# Patient Record
Sex: FEMALE | Race: WHITE | NOT HISPANIC OR LATINO | Employment: FULL TIME | URBAN - METROPOLITAN AREA
[De-identification: names, ages, dates, MRNs, and addresses within clinical notes are randomized per-mention and may not be internally consistent; named-entity substitution may affect disease eponyms.]

---

## 2023-02-07 ENCOUNTER — APPOINTMENT (OUTPATIENT)
Dept: GENERAL RADIOLOGY | Facility: HOSPITAL | Age: 58
End: 2023-02-07
Payer: COMMERCIAL

## 2023-02-07 ENCOUNTER — HOSPITAL ENCOUNTER (EMERGENCY)
Facility: HOSPITAL | Age: 58
Discharge: HOME | End: 2023-02-07
Attending: EMERGENCY MEDICINE | Admitting: EMERGENCY MEDICINE
Payer: COMMERCIAL

## 2023-02-07 VITALS
DIASTOLIC BLOOD PRESSURE: 62 MMHG | OXYGEN SATURATION: 97 % | RESPIRATION RATE: 18 BRPM | HEIGHT: 67 IN | HEART RATE: 69 BPM | TEMPERATURE: 98.2 F | SYSTOLIC BLOOD PRESSURE: 117 MMHG | WEIGHT: 284 LBS | BODY MASS INDEX: 44.57 KG/M2

## 2023-02-07 DIAGNOSIS — R00.2 PALPITATIONS: Primary | ICD-10-CM

## 2023-02-07 DIAGNOSIS — R55 NEAR SYNCOPE: ICD-10-CM

## 2023-02-07 LAB
ALBUMIN SERPL-MCNC: 4.5 G/DL (ref 3.5–5.2)
ALBUMIN/GLOB SERPL: 1.6 G/DL
ALP SERPL-CCNC: 73 U/L (ref 39–117)
ALT SERPL W P-5'-P-CCNC: 36 U/L (ref 1–33)
ANION GAP SERPL CALCULATED.3IONS-SCNC: 10 MMOL/L (ref 5–15)
AST SERPL-CCNC: 34 U/L (ref 1–32)
BASOPHILS # BLD AUTO: 0.02 10*3/MM3 (ref 0–0.2)
BASOPHILS NFR BLD AUTO: 0.4 % (ref 0–1.5)
BILIRUB SERPL-MCNC: 0.8 MG/DL (ref 0–1.2)
BILIRUB UR QL STRIP: NEGATIVE
BUN SERPL-MCNC: 12 MG/DL (ref 6–20)
BUN/CREAT SERPL: 15.4 (ref 7–25)
CALCIUM SPEC-SCNC: 9.2 MG/DL (ref 8.6–10.5)
CHLORIDE SERPL-SCNC: 104 MMOL/L (ref 98–107)
CLARITY UR: CLEAR
CO2 SERPL-SCNC: 26 MMOL/L (ref 22–29)
COLOR UR: YELLOW
CREAT SERPL-MCNC: 0.78 MG/DL (ref 0.57–1)
DEPRECATED RDW RBC AUTO: 41.8 FL (ref 37–54)
EGFRCR SERPLBLD CKD-EPI 2021: 89.8 ML/MIN/1.73
EOSINOPHIL # BLD AUTO: 0.04 10*3/MM3 (ref 0–0.4)
EOSINOPHIL NFR BLD AUTO: 0.9 % (ref 0.3–6.2)
ERYTHROCYTE [DISTWIDTH] IN BLOOD BY AUTOMATED COUNT: 12.5 % (ref 12.3–15.4)
GEN 5 2HR TROPONIN T REFLEX: 8 NG/L
GLOBULIN UR ELPH-MCNC: 2.8 GM/DL
GLUCOSE SERPL-MCNC: 102 MG/DL (ref 65–99)
GLUCOSE UR STRIP-MCNC: NEGATIVE MG/DL
HCT VFR BLD AUTO: 46 % (ref 34–46.6)
HGB BLD-MCNC: 15.3 G/DL (ref 12–15.9)
HGB UR QL STRIP.AUTO: NEGATIVE
HOLD SPECIMEN: NORMAL
IMM GRANULOCYTES # BLD AUTO: 0.02 10*3/MM3 (ref 0–0.05)
IMM GRANULOCYTES NFR BLD AUTO: 0.4 % (ref 0–0.5)
KETONES UR QL STRIP: NEGATIVE
LEUKOCYTE ESTERASE UR QL STRIP.AUTO: NEGATIVE
LIPASE SERPL-CCNC: 31 U/L (ref 13–60)
LYMPHOCYTES # BLD AUTO: 0.98 10*3/MM3 (ref 0.7–3.1)
LYMPHOCYTES NFR BLD AUTO: 21.4 % (ref 19.6–45.3)
MCH RBC QN AUTO: 30.5 PG (ref 26.6–33)
MCHC RBC AUTO-ENTMCNC: 33.3 G/DL (ref 31.5–35.7)
MCV RBC AUTO: 91.6 FL (ref 79–97)
MONOCYTES # BLD AUTO: 0.26 10*3/MM3 (ref 0.1–0.9)
MONOCYTES NFR BLD AUTO: 5.7 % (ref 5–12)
NEUTROPHILS NFR BLD AUTO: 3.25 10*3/MM3 (ref 1.7–7)
NEUTROPHILS NFR BLD AUTO: 71.2 % (ref 42.7–76)
NITRITE UR QL STRIP: NEGATIVE
NRBC BLD AUTO-RTO: 0 /100 WBC (ref 0–0.2)
NT-PROBNP SERPL-MCNC: 34.4 PG/ML (ref 0–900)
PH UR STRIP.AUTO: 6 [PH] (ref 5–8)
PLATELET # BLD AUTO: 244 10*3/MM3 (ref 140–450)
PMV BLD AUTO: 9.2 FL (ref 6–12)
POTASSIUM SERPL-SCNC: 4 MMOL/L (ref 3.5–5.2)
PROT SERPL-MCNC: 7.3 G/DL (ref 6–8.5)
PROT UR QL STRIP: NEGATIVE
RBC # BLD AUTO: 5.02 10*6/MM3 (ref 3.77–5.28)
SODIUM SERPL-SCNC: 140 MMOL/L (ref 136–145)
SP GR UR STRIP: 1.01 (ref 1–1.03)
T4 FREE SERPL-MCNC: 2.3 NG/DL (ref 0.93–1.7)
TROPONIN T DELTA: 1 NG/L
TROPONIN T SERPL HS-MCNC: 7 NG/L
TSH SERPL DL<=0.05 MIU/L-ACNC: 1.76 UIU/ML (ref 0.27–4.2)
UROBILINOGEN UR QL STRIP: NORMAL
WBC NRBC COR # BLD: 4.57 10*3/MM3 (ref 3.4–10.8)
WHOLE BLOOD HOLD COAG: NORMAL
WHOLE BLOOD HOLD SPECIMEN: NORMAL

## 2023-02-07 PROCEDURE — 93005 ELECTROCARDIOGRAM TRACING: CPT

## 2023-02-07 PROCEDURE — 81003 URINALYSIS AUTO W/O SCOPE: CPT

## 2023-02-07 PROCEDURE — 83690 ASSAY OF LIPASE: CPT

## 2023-02-07 PROCEDURE — 99284 EMERGENCY DEPT VISIT MOD MDM: CPT

## 2023-02-07 PROCEDURE — 84484 ASSAY OF TROPONIN QUANT: CPT

## 2023-02-07 PROCEDURE — 84484 ASSAY OF TROPONIN QUANT: CPT | Performed by: EMERGENCY MEDICINE

## 2023-02-07 PROCEDURE — 36415 COLL VENOUS BLD VENIPUNCTURE: CPT

## 2023-02-07 PROCEDURE — 71045 X-RAY EXAM CHEST 1 VIEW: CPT

## 2023-02-07 PROCEDURE — 83880 ASSAY OF NATRIURETIC PEPTIDE: CPT

## 2023-02-07 PROCEDURE — 80050 GENERAL HEALTH PANEL: CPT

## 2023-02-07 PROCEDURE — 84439 ASSAY OF FREE THYROXINE: CPT | Performed by: PHYSICIAN ASSISTANT

## 2023-02-07 PROCEDURE — 93005 ELECTROCARDIOGRAM TRACING: CPT | Performed by: EMERGENCY MEDICINE

## 2023-02-07 RX ORDER — ASPIRIN 81 MG/1
324 TABLET, CHEWABLE ORAL ONCE
Status: DISCONTINUED | OUTPATIENT
Start: 2023-02-07 | End: 2023-02-07

## 2023-02-07 RX ORDER — ALPRAZOLAM 0.25 MG/1
0.5 TABLET ORAL ONCE
Status: COMPLETED | OUTPATIENT
Start: 2023-02-07 | End: 2023-02-07

## 2023-02-07 RX ORDER — SODIUM CHLORIDE 0.9 % (FLUSH) 0.9 %
10 SYRINGE (ML) INJECTION AS NEEDED
Status: DISCONTINUED | OUTPATIENT
Start: 2023-02-07 | End: 2023-02-07 | Stop reason: HOSPADM

## 2023-02-07 RX ADMIN — ALPRAZOLAM 0.25 MG: 0.25 TABLET ORAL at 17:29

## 2023-02-07 NOTE — ED PROVIDER NOTES
EMERGENCY DEPARTMENT ENCOUNTER    Pt Name: Casie Butterfield  MRN: [id number]  Pt :   1965  Room Number:    Date of encounter:  2023  PCP: Mickey Guzman MD  ED Provider: Soy Can PA-C    Historian: Patient      HPI:  Chief Complaint: Palpitations        Context: Casie Butterfield is a 57 y.o. female who presents to the ED c/o intermittent palpitations that she has had for some time, but has seemed to have worsened since yesterday.  She describes random intermittent palpitations that cause her to feel a little breathless for the moment and then resolve spontaneously.  She does note that they sometimes occur when she is having more episodes of GERD.  She states the episodes make her feel a little anxious, they are not associated with chest pain arm neck jaw shoulder pain.  No syncope, but did have an episode yesterday of lightheadedness associated with feelings of near syncope.  No history of thyroid disease.  She was seen by her primary care provider yesterday who performed an EKG and blood work that so far is normal.  Her symptoms continued today so she was advised to present to the emergency department for evaluation.  She does have a history of hypertension, takes losartan 50 mg daily.      PAST MEDICAL HISTORY  HTN - losartan 50mg  Allergic rhinitis      PAST SURGICAL HISTORY  GB  C-sect x 2  Fibroid cyst from back    FAMILY HISTORY      SOCIAL HISTORY  Social History     Socioeconomic History   • Marital status:          ALLERGIES  Other        REVIEW OF SYSTEMS  Review of Systems   Constitutional: Positive for activity change. Negative for appetite change, chills, diaphoresis, fatigue and fever.   HENT: Negative for congestion, rhinorrhea and sore throat.    Respiratory: Negative for cough, shortness of breath and wheezing.    Cardiovascular: Positive for palpitations. Negative for chest pain and leg swelling.   Gastrointestinal: Positive for abdominal pain.  Negative for nausea and vomiting.   Genitourinary: Negative for dysuria, flank pain and hematuria.   Musculoskeletal: Negative for arthralgias, back pain, myalgias and neck pain.   Neurological: Positive for dizziness and light-headedness. Negative for syncope.   All other systems reviewed and are negative.         All systems reviewed and negative except for those discussed in HPI.       PHYSICAL EXAM    I have reviewed the triage vital signs and nursing notes.    ED Triage Vitals [02/07/23 1127]   Temp Heart Rate Resp BP SpO2   98.2 °F (36.8 °C) 105 18 (!) 149/105 99 %      Temp src Heart Rate Source Patient Position BP Location FiO2 (%)   Oral Monitor Sitting Left arm --       Physical Exam  GENERAL:   Appears in no acute distress.  Pleasant awake alert and oriented nontoxic-appearing hemodynamically stable, not in acute distress.  HENT: Nares patent.  No facial asymmetry, neck is supple, no palpable goiters or nodules.  EYES: No scleral icterus.  CV: Regular rhythm, regular rate.  Regular rate and rhythm no murmurs rubs or gallops.  RESPIRATORY: Normal effort.  No audible wheezes, rales or rhonchi.  ABDOMEN: Soft, nontender  MUSCULOSKELETAL: No deformities.  No LE edema  NEURO: Alert, moves all extremities, follows commands.  SKIN: Warm, dry, no rash visualized.      LAB RESULTS  Recent Results (from the past 24 hour(s))   ECG 12 Lead ED Triage Standing Order; Chest Pain    Collection Time: 02/07/23 11:37 AM   Result Value Ref Range    QT Interval 352 ms    QTC Interval 401 ms   Urinalysis With Microscopic If Indicated (No Culture) - Urine, Clean Catch    Collection Time: 02/07/23 11:40 AM    Specimen: Urine, Clean Catch   Result Value Ref Range    Color, UA Yellow Yellow, Straw    Appearance, UA Clear Clear    pH, UA 6.0 5.0 - 8.0    Specific Gravity, UA 1.010 1.001 - 1.030    Glucose, UA Negative Negative    Ketones, UA Negative Negative    Bilirubin, UA Negative Negative    Blood, UA Negative Negative     Protein, UA Negative Negative    Leuk Esterase, UA Negative Negative    Nitrite, UA Negative Negative    Urobilinogen, UA 0.2 E.U./dL 0.2 - 1.0 E.U./dL   Troponin    Collection Time: 02/07/23 11:43 AM    Specimen: Blood   Result Value Ref Range    HS Troponin T 7 <10 ng/L   Comprehensive Metabolic Panel    Collection Time: 02/07/23 11:43 AM    Specimen: Blood   Result Value Ref Range    Glucose 102 (H) 65 - 99 mg/dL    BUN 12 6 - 20 mg/dL    Creatinine 0.78 0.57 - 1.00 mg/dL    Sodium 140 136 - 145 mmol/L    Potassium 4.0 3.5 - 5.2 mmol/L    Chloride 104 98 - 107 mmol/L    CO2 26.0 22.0 - 29.0 mmol/L    Calcium 9.2 8.6 - 10.5 mg/dL    Total Protein 7.3 6.0 - 8.5 g/dL    Albumin 4.5 3.5 - 5.2 g/dL    ALT (SGPT) 36 (H) 1 - 33 U/L    AST (SGOT) 34 (H) 1 - 32 U/L    Alkaline Phosphatase 73 39 - 117 U/L    Total Bilirubin 0.8 0.0 - 1.2 mg/dL    Globulin 2.8 gm/dL    A/G Ratio 1.6 g/dL    BUN/Creatinine Ratio 15.4 7.0 - 25.0    Anion Gap 10.0 5.0 - 15.0 mmol/L    eGFR 89.8 >60.0 mL/min/1.73   Lipase    Collection Time: 02/07/23 11:43 AM    Specimen: Blood   Result Value Ref Range    Lipase 31 13 - 60 U/L   BNP    Collection Time: 02/07/23 11:43 AM    Specimen: Blood   Result Value Ref Range    proBNP 34.4 0.0 - 900.0 pg/mL   Green Top (Gel)    Collection Time: 02/07/23 11:43 AM   Result Value Ref Range    Extra Tube Hold for add-ons.    Lavender Top    Collection Time: 02/07/23 11:43 AM   Result Value Ref Range    Extra Tube hold for add-on    Gold Top - SST    Collection Time: 02/07/23 11:43 AM   Result Value Ref Range    Extra Tube Hold for add-ons.    Gray Top    Collection Time: 02/07/23 11:43 AM   Result Value Ref Range    Extra Tube Hold for add-ons.    Light Blue Top    Collection Time: 02/07/23 11:43 AM   Result Value Ref Range    Extra Tube Hold for add-ons.    CBC Auto Differential    Collection Time: 02/07/23 11:43 AM    Specimen: Blood   Result Value Ref Range    WBC 4.57 3.40 - 10.80 10*3/mm3    RBC 5.02 3.77  - 5.28 10*6/mm3    Hemoglobin 15.3 12.0 - 15.9 g/dL    Hematocrit 46.0 34.0 - 46.6 %    MCV 91.6 79.0 - 97.0 fL    MCH 30.5 26.6 - 33.0 pg    MCHC 33.3 31.5 - 35.7 g/dL    RDW 12.5 12.3 - 15.4 %    RDW-SD 41.8 37.0 - 54.0 fl    MPV 9.2 6.0 - 12.0 fL    Platelets 244 140 - 450 10*3/mm3    Neutrophil % 71.2 42.7 - 76.0 %    Lymphocyte % 21.4 19.6 - 45.3 %    Monocyte % 5.7 5.0 - 12.0 %    Eosinophil % 0.9 0.3 - 6.2 %    Basophil % 0.4 0.0 - 1.5 %    Immature Grans % 0.4 0.0 - 0.5 %    Neutrophils, Absolute 3.25 1.70 - 7.00 10*3/mm3    Lymphocytes, Absolute 0.98 0.70 - 3.10 10*3/mm3    Monocytes, Absolute 0.26 0.10 - 0.90 10*3/mm3    Eosinophils, Absolute 0.04 0.00 - 0.40 10*3/mm3    Basophils, Absolute 0.02 0.00 - 0.20 10*3/mm3    Immature Grans, Absolute 0.02 0.00 - 0.05 10*3/mm3    nRBC 0.0 0.0 - 0.2 /100 WBC   T4, Free    Collection Time: 02/07/23 11:43 AM    Specimen: Blood   Result Value Ref Range    Free T4 2.30 (H) 0.93 - 1.70 ng/dL   TSH    Collection Time: 02/07/23 11:43 AM    Specimen: Blood   Result Value Ref Range    TSH 1.760 0.270 - 4.200 uIU/mL   ECG 12 Lead ED Triage Standing Order; Chest Pain    Collection Time: 02/07/23  2:02 PM   Result Value Ref Range    QT Interval 382 ms    QTC Interval 418 ms   High Sensitivity Troponin T 2Hr    Collection Time: 02/07/23  2:04 PM    Specimen: Blood   Result Value Ref Range    HS Troponin T 8 <10 ng/L    Troponin T Delta 1 <= -/+ 4 Change ng/L       If labs were ordered, I independently reviewed the results and considered them in treating the patient.        RADIOLOGY  XR Chest 1 View    Result Date: 2/7/2023  XR CHEST 1 VW Date of Exam: 2/7/2023 12:39 PM EST Indication: Chest Pain Triage Protocol. Comparison: None available. FINDINGS: No focal airspace opacity. No pleural effusion or pneumothorax. Normal heart and mediastinal contours.     No evidence of acute disease in the chest. Electronically Signed: Pipe Willson  2/7/2023 1:10 PM EST  Workstation ID:  [id number]            PROCEDURES    Procedures    ECG 12 Lead ED Triage Standing Order; Chest Pain   Preliminary Result   Test Reason : ED Triage Standing Order~   Blood Pressure :   */*   mmHG   Vent. Rate :  72 BPM     Atrial Rate :  72 BPM      P-R Int : 186 ms          QRS Dur :  74 ms       QT Int : 382 ms       P-R-T Axes :  45  19  32 degrees      QTc Int : 418 ms      Normal sinus rhythm   Normal ECG   When compared with ECG of 07-FEB-2023 11:37, (Unconfirmed)   No significant change was found      Referred By: ERMD           Confirmed By:       ECG 12 Lead ED Triage Standing Order; Chest Pain   Preliminary Result   Test Reason : ED Triage Standing Order~   Blood Pressure :   */*   mmHG   Vent. Rate :  78 BPM     Atrial Rate :  78 BPM      P-R Int : 172 ms          QRS Dur :  68 ms       QT Int : 352 ms       P-R-T Axes :  51  12  35 degrees      QTc Int : 401 ms      Normal sinus rhythm   Normal ECG   No previous ECGs available      Referred By: ED MD           Confirmed By:           MEDICATIONS GIVEN IN ER    Medications   ALPRAZolam (XANAX) tablet 0.5 mg (0.25 mg Oral Given 2/7/23 1729)         MEDICAL DECISION MAKING, PROGRESS, and CONSULTS    All labs have been independently reviewed by me.  All radiology studies have been reviewed by me and the radiologist dictating the report.  All EKG's have been independently viewed and interpreted by me.      Discussion below represents my analysis of pertinent findings related to patient's condition, differential diagnosis, treatment plan and final disposition.      Differential diagnosis:    Wide differential including premature atrial contractions, PVCs, atrial/ventricular arrhythmia, electrolyte abnormality, thyroid dysfunction    Additional sources:    - Discussed/ obtained information from independent historians: Patient provided her own history    - External (non-ED) record review: No pertinent external records available to review    - Chronic or social  conditions impacting care:      - Shared decision making:        Orders placed during this visit:  Orders Placed This Encounter   Procedures   • XR Chest 1 View   • [place] Draw   • Troponin   • Comprehensive Metabolic Panel   • Lipase   • BNP   • CBC Auto Differential   • Urinalysis With Microscopic If Indicated (No Culture) - Urine, Clean Catch   • High Sensitivity Troponin T 2Hr   • T4, Free   • TSH   • Ambulatory Referral to [facility] - Arrhythmia Clinic   • Undress & Gown   • Continuous Pulse Oximetry   • ECG 12 Lead ED Triage Standing Order; Chest Pain   • CBC & Differential   • Green Top (Gel)   • Lavender Top   • Gold Top - SST   • Gray Top   • Light Blue Top         Additional orders considered but not ordered:      ED Course:    Consultants:      ED Course as of 02/07/23 2106 Tue Feb 07, 2023   1416 Free T4(!): 2.30 [TG]   1416 TSH Baseline: 1.760 [TG]      ED Course User Index  [TG] Soy Can PA-C            Cardiology has placed a 7-day monitor on patient.  She is to mail the monitor back in when finished.  She will be referred to [facility] heart and valves arrhythmia clinic.  She is to return if any change or worsening of symptoms in interim.  She verbalizes understanding and is agreeable to plan.      AS OF 21:06 EST VITALS:    BP - 117/62  HR - 69  TEMP - 98.2 °F (36.8 °C) (Oral)  O2 SATS - 97%                  DIAGNOSIS  Final diagnoses:   Palpitations   Near syncope         DISPOSITION  DISCHARGE    Patient discharged in stable condition.    Reviewed implications of results, diagnosis, meds, responsibility to follow up, warning signs and symptoms of possible worsening, potential complications and reasons to return to ER.    Patient/Family voiced understanding of above instructions.    Discussed plan for discharge, as there is no emergent indication for admission.  Pt/family is agreeable and understands need for follow up and possible repeat testing.  Pt/family is aware that discharge does not  mean that nothing is wrong but that it indicates no emergency is currently present that requires admission and they must continue care with follow-up as given below or with a physician of their choice.     FOLLOW-UP  [facility] CARDIOLOGY  [address]  [place] [address]  [facility] [id number] [phone]        Mickey Guzman MD  [address] [place] [address] [address] [place] [address] [phone]    Schedule an appointment as soon as possible for a visit            Medication List      No changes were made to your prescriptions during this visit.             Please note that portions of this document were completed with voice recognition software.      Soy Can PA-C  02/07/23 0289

## 2023-02-07 NOTE — DISCHARGE INSTRUCTIONS
You have been referred to [facility]'s arrhythmia clinic.  You should receive a call by 10 AM next business day to schedule your appointment.  If you do not hear for ministration by 10 AM tomorrow, call [phone], ask to speak with registration, schedule your appointment.  Return to the emergency department immediately if any change or worsening of symptoms.

## 2023-02-14 ENCOUNTER — OFFICE VISIT (OUTPATIENT)
Dept: CARDIOLOGY | Facility: HOSPITAL | Age: 58
End: 2023-02-14
Payer: COMMERCIAL

## 2023-02-14 VITALS
DIASTOLIC BLOOD PRESSURE: 83 MMHG | WEIGHT: 283.8 LBS | SYSTOLIC BLOOD PRESSURE: 147 MMHG | RESPIRATION RATE: 20 BRPM | OXYGEN SATURATION: 99 % | TEMPERATURE: 98.1 F | BODY MASS INDEX: 44.54 KG/M2 | HEIGHT: 67 IN | HEART RATE: 87 BPM

## 2023-02-14 DIAGNOSIS — R55 NEAR SYNCOPE: ICD-10-CM

## 2023-02-14 DIAGNOSIS — F41.9 ANXIETY: ICD-10-CM

## 2023-02-14 DIAGNOSIS — R00.2 PALPITATIONS: Primary | ICD-10-CM

## 2023-02-14 PROCEDURE — 99203 OFFICE O/P NEW LOW 30 MIN: CPT | Performed by: NURSE PRACTITIONER

## 2023-02-14 RX ORDER — LOSARTAN POTASSIUM 50 MG/1
1 TABLET ORAL DAILY
COMMUNITY
Start: 2022-12-01

## 2023-02-14 RX ORDER — LORATADINE 10 MG/1
10 TABLET ORAL DAILY
COMMUNITY

## 2023-02-14 RX ORDER — ROSUVASTATIN CALCIUM 5 MG/1
1 TABLET, COATED ORAL NIGHTLY
COMMUNITY
Start: 2023-01-16

## 2023-02-14 RX ORDER — FLUTICASONE PROPIONATE 50 MCG
2 SPRAY, SUSPENSION (ML) NASAL EVERY MORNING
COMMUNITY
Start: 2022-12-17

## 2023-02-14 RX ORDER — DEXLANSOPRAZOLE 60 MG/1
60 CAPSULE, DELAYED RELEASE ORAL DAILY PRN
COMMUNITY

## 2023-02-14 RX ORDER — MONTELUKAST SODIUM 10 MG/1
1 TABLET ORAL
COMMUNITY
Start: 2022-12-20

## 2023-02-14 NOTE — PROGRESS NOTES
"Chief Complaint  Establish Care (/), Palpitations (/), and Near Syncope    Subjective    History of Present Illness {CC  Problem List  Visit  Diagnosis   Encounters  Notes  Medications  Labs  Result Review Imaging  Media :23}       History of Present Illness   57-year-old female presents the office today for ongoing evaluation of her palpitations and near syncope.  She presented to [facility] ED on 2/7/2023 with complaints of near syncope patient's.  She does report anxiety as well.  Patient was placed in an extended Holter and we will mail that back today.  She currently denies chest pain dyspnea, syncope, pedal edema.Past medical history significant for hyperlipidemia, hypertension, allergies, GERD  Objective     Vital Signs:   Vitals:    02/14/23 1004 02/14/23 1005 02/14/23 1006   BP: 146/78 143/80 147/83   BP Location: Right arm Left arm Left arm   Patient Position: Sitting Standing Sitting   Cuff Size: Large Adult Large Adult Large Adult   Pulse: 81 89 87   Resp:   20   Temp: 96.8 °F (36 °C) 98.1 °F (36.7 °C) 98.1 °F (36.7 °C)   TempSrc: Temporal Temporal Temporal   SpO2: 98% 98% 99%   Weight:   129 kg (283 lb 12.8 oz)   Height:   170.2 cm (67\")     Body mass index is 44.45 kg/m².  Physical Exam  Vitals and nursing note reviewed.   Constitutional:       Appearance: Normal appearance.   HENT:      Head: Normocephalic.   Eyes:      Pupils: Pupils are equal, round, and reactive to light.   Cardiovascular:      Rate and Rhythm: Normal rate and regular rhythm.      Pulses: Normal pulses.      Heart sounds: Normal heart sounds. No murmur heard.  Pulmonary:      Effort: Pulmonary effort is normal.      Breath sounds: Normal breath sounds.   Abdominal:      General: Bowel sounds are normal.      Palpations: Abdomen is soft.   Musculoskeletal:         General: Normal range of motion.      Cervical back: Normal range of motion.      Right lower leg: No edema.      Left lower leg: No edema. "   Skin:     General: Skin is warm and dry.      Capillary Refill: Capillary refill takes less than 2 seconds.   Neurological:      Mental Status: She is alert and oriented to person, place, and time.   Psychiatric:         Mood and Affect: Mood normal.         Thought Content: Thought content normal.              Result Review  Data Reviewed:{ Labs  Result Review  Imaging  Med Tab  Media :23}     Holter pending  High Sensitivity Troponin T 2Hr (02/07/2023 14:04)  TSH (02/07/2023 11:43)  T4, Free (02/07/2023 11:43)  BNP (02/07/2023 11:43)  Lipase (02/07/2023 11:43)  Comprehensive Metabolic Panel (02/07/2023 11:43)  CBC & Differential (02/07/2023 11:43)  Troponin (02/07/2023 11:43)  Urinalysis With Microscopic If Indicated (No Culture) - Urine, Clean Catch (02/07/2023 11:40)  ECG 12 Lead ED Triage Standing Order; Chest Pain (02/07/2023 11:37)  ECG 12 Lead ED Triage Standing Order; Chest Pain (02/07/2023 14:02)       Assessment and Plan {CC Problem List  Visit Diagnosis  ROS  Review (Popup)  Health Maintenance  Quality  BestPractice  Medications  SmartSets  SnapShot Encounters  Media :23}   1. Palpitations  Patient just completed an extended Holter removal mail that back today   will call patient once results are finalized    2. Near syncope  Encouraged good hydration    3. Anxiety  Encourage patient to find ways to manage anxiety          Follow Up {Instructions Charge Capture  Follow-up Communications :23}   Return if symptoms worsen or fail to improve.    Patient was given instructions and counseling regarding her condition or for health maintenance advice. Please see specific information pulled into the AVS if appropriate.  Patient was instructed to call the [facility] with any questions, concerns, or worsening symptoms.

## 2023-02-27 ENCOUNTER — TELEPHONE (OUTPATIENT)
Dept: CARDIOLOGY | Facility: HOSPITAL | Age: 58
End: 2023-02-27
Payer: COMMERCIAL

## 2023-02-27 NOTE — TELEPHONE ENCOUNTER
Reviewed 7-day Holter with patient.  Average heart rate 79 bpm, PAC burden 0.02%, PVC burden 0.2% with 7 runs of atrial tach; the longest lasted 13 beats and not associated with patient triggered events.  Patient reports the palpitations have decreased in frequency.  She reports that she has stopped drinking caffeine and is now following the GERD diet.  Encourage patient to continue following GERD diet was instructed to call us if palpitations return.  Patient verbalized understanding.

## 2023-02-28 LAB
QT INTERVAL: 352 MS
QT INTERVAL: 382 MS
QTC INTERVAL: 401 MS
QTC INTERVAL: 418 MS

## 2024-10-14 ENCOUNTER — TELEPHONE (OUTPATIENT)
Dept: CARDIOLOGY | Facility: HOSPITAL | Age: 59
End: 2024-10-14

## 2024-10-14 NOTE — TELEPHONE ENCOUNTER
Caller: Casie Butterfield    Relationship to patient: Self    Best call back number: [phone]     Chief complaint: CHEST PAIN     Type of visit: F/U APPT    Requested date: AT [facility]       Additional notes:PATIENT SEEN IN THE E.D. AT [facility] ON 10-13-24 AND ADVISED TO SEE CARDIOLOGY ABOUT A POSSIBLE STRESS TEST. PLEASE CONTACT PATIENT IN REGARDS TO AN APPT.

## 2025-01-30 ENCOUNTER — OFFICE VISIT (OUTPATIENT)
Dept: GASTROENTEROLOGY | Facility: CLINIC | Age: 60
End: 2025-01-30
Payer: COMMERCIAL

## 2025-01-30 VITALS
TEMPERATURE: 97.4 F | SYSTOLIC BLOOD PRESSURE: 132 MMHG | DIASTOLIC BLOOD PRESSURE: 76 MMHG | HEIGHT: 67 IN | BODY MASS INDEX: 43.32 KG/M2 | OXYGEN SATURATION: 98 % | HEART RATE: 102 BPM | WEIGHT: 276 LBS

## 2025-01-30 DIAGNOSIS — K21.9 GASTROESOPHAGEAL REFLUX DISEASE, UNSPECIFIED WHETHER ESOPHAGITIS PRESENT: Primary | ICD-10-CM

## 2025-01-30 DIAGNOSIS — Z79.1 NSAID LONG-TERM USE: ICD-10-CM

## 2025-01-30 DIAGNOSIS — Z12.11 SCREENING FOR COLON CANCER: ICD-10-CM

## 2025-01-30 DIAGNOSIS — R10.13 EPIGASTRIC PAIN: ICD-10-CM

## 2025-01-30 RX ORDER — DEXLANSOPRAZOLE 60 MG/1
60 CAPSULE, DELAYED RELEASE ORAL DAILY
Qty: 90 CAPSULE | Refills: 3 | Status: SHIPPED | OUTPATIENT
Start: 2025-01-30

## 2025-01-30 RX ORDER — SULFAMETHOXAZOLE AND TRIMETHOPRIM 800; 160 MG/1; MG/1
TABLET ORAL
COMMUNITY
Start: 2024-12-03

## 2025-01-30 NOTE — PROGRESS NOTES
New Patient Consultation     Patient Name: Casie Butterfield  : 1965   MRN: [id number]     Chief Complaint:  No chief complaint on file.      History of Present Illness: Casie Butterfield is a 59 y.o. female, PMH includes HTN, HL, GERD, who is here today for a Gastroenterology Consultation for abdominal pain.     Pt notes onset of epigastric abdominal pain that started in 2024. She identifies stressors such as planning her daughter's wedding and eating out frequently to be particular triggers. She has been taking Dexilant 60mg daily with good relief and resolution of sx. She generally follows a GERD friendly diet. She has had extensive cardiac workup to rule out additional sources of sx.     Patient denies associated fever, chills, nausea, vomiting, diarrhea, constipation, hematemesis, dysphagia, hematochezia, melena, bloating, weight loss or gain, dysuria, jaundice or bruising.    Patient denies personal or FHx of PUD, H Pylori, gastritis, pancreatitis, colitis, Celiac disease, UC, Crohn's disease, IBS, colon or gastric cancers. Pt denies EtOH, tobacco, illicit substance use. S/p C section, tubal ligation, CCY. Diclofenac daily for knee pain. Father and maternal aunt with alpha 1 antitrypsin deficiency; pt states that her personal testing within the last year was unremarkable.     No previous EGD / CSY.     Subjective      Review of Systems:   Review of Systems   Constitutional:  Negative for appetite change, chills, diaphoresis, fatigue, fever, unexpected weight gain and unexpected weight loss.   HENT:  Negative for drooling, facial swelling, mouth sores, rhinorrhea, sore throat, tinnitus, trouble swallowing and voice change.    Eyes: Negative.    Respiratory:  Negative for cough, chest tightness and shortness of breath.    Cardiovascular:  Negative for chest pain.   Gastrointestinal:  Positive for abdominal pain (epigastric, resolved). Negative for blood in stool, constipation,  diarrhea, nausea, vomiting, GERD and indigestion.   Genitourinary:  Negative for dysuria, flank pain, hematuria and pelvic pain.   Skin:  Negative for color change, pallor and rash.   Neurological:  Negative for dizziness, tremors, syncope, weakness and numbness.   Psychiatric/Behavioral:  Negative for hallucinations and sleep disturbance.    All other systems reviewed and are negative.      Past Medical History:   Past Medical History:   Diagnosis Date    Allergies     HTN (hypertension)        Past Surgical History:   Past Surgical History:   Procedure Laterality Date     SECTION      ,     CHOLECYSTECTOMY      LIPOMA EXCISION         Family History:   Family History   Problem Relation Age of Onset    Heart disease Mother     Epilepsy Mother     Other Father         A1A deficiency    Asthma Sister     Mitral valve prolapse Sister     Anxiety disorder Sister     No Known Problems Brother     Other Maternal Grandmother         tobacco use    Heart attack Maternal Grandmother     Alcohol abuse Maternal Grandmother     Heart attack Maternal Grandfather     Hypertension Paternal Grandmother     Parkinsonism Paternal Grandfather        Social History:   Social History     Socioeconomic History    Marital status:    Tobacco Use    Smoking status: Former     Current packs/day: 0.00     Types: Cigarettes     Quit date:      Years since quittin.0    Smokeless tobacco: Never   Vaping Use    Vaping status: Never Used   Substance and Sexual Activity    Alcohol use: Not Currently    Drug use: Never    Sexual activity: Defer       Alcohol/Tobacco History:   Social History     Substance and Sexual Activity   Alcohol Use Not Currently     Social History     Tobacco Use   Smoking Status Former    Current packs/day: 0.00    Types: Cigarettes    Quit date:     Years since quittin.0   Smokeless Tobacco Never       Medications:     Current Outpatient Medications:      sulfamethoxazole-trimethoprim (BACTRIM DS,SEPTRA DS) 800-160 MG per tablet, , Disp: , Rfl:     dexlansoprazole (Dexilant) 60 MG capsule, Take 60 mg by mouth Daily As Needed., Disp: , Rfl:     DICLOFENAC PO, Take 75 mg by mouth Daily., Disp: , Rfl:     fluticasone (FLONASE) 50 MCG/ACT nasal spray, 2 sprays by Each Nare route Every Morning., Disp: , Rfl:     loratadine (Claritin) 10 MG tablet, Take 10 mg by mouth Daily., Disp: , Rfl:     losartan (COZAAR) 50 MG tablet, Take 1 tablet by mouth Daily., Disp: , Rfl:     montelukast (SINGULAIR) 10 MG tablet, Take 1 tablet by mouth every night at bedtime., Disp: , Rfl:     rosuvastatin (CRESTOR) 5 MG tablet, Take 1 tablet by mouth Every Night., Disp: , Rfl:     Allergies:   Allergies   Allergen Reactions    Other Itching     STEROIDS- PT STATES IT CAUSES HER FACE TO TURN HEAD AND BECOME ITCHY       Objective     Physical Exam:  Vital Signs: There were no vitals filed for this visit.  There is no height or weight on file to calculate BMI.     Physical Exam  Vitals and nursing note reviewed.   Constitutional:       Appearance: Normal appearance. She is normal weight. She is not ill-appearing or diaphoretic.      Comments: BMI 43.23   HENT:      Head: Normocephalic and atraumatic.      Right Ear: External ear normal.      Left Ear: External ear normal.      Nose: Nose normal.      Mouth/Throat:      Mouth: Mucous membranes are moist.      Pharynx: Oropharynx is clear.   Eyes:      Conjunctiva/sclera: Conjunctivae normal.      Pupils: Pupils are equal, round, and reactive to light.   Neck:      Thyroid: No thyromegaly.   Cardiovascular:      Rate and Rhythm: Normal rate and regular rhythm.      Pulses: Normal pulses.      Heart sounds: Normal heart sounds.   Pulmonary:      Effort: Pulmonary effort is normal.      Breath sounds: Normal breath sounds.   Abdominal:      General: Abdomen is flat. Bowel sounds are normal. There is no distension.      Tenderness: There is no  abdominal tenderness.   Musculoskeletal:         General: Normal range of motion.      Cervical back: Normal range of motion and neck supple.   Skin:     General: Skin is warm and dry.   Neurological:      General: No focal deficit present.      Mental Status: She is oriented to person, place, and time.   Psychiatric:         Mood and Affect: Mood normal.         Assessment / Plan      Assessment/Plan:   There are no diagnoses linked to this encounter.     GERD  Epigastric pain  Long term NSAID use  Need for screening CSY   - continue Dexilant 60mg daily, refills sent to pharmacy    - pt given GERD diet instructions, advised to avoid GI irritants such as caffeine, carbonation, EtOH, tobacco, chocolate, peppermint, acid-based foods   - schedule for EGD / CSY   - follow up in clinic after completion of above studies   - call clinic at any time for questions or new / worsened sx    Follow Up:   Return for Next scheduled follow up.    Plan of care reviewed with the patient at the conclusion of today's visit.  Education was provided regarding diagnosis, management, and any prescribed or recommended OTC medications.  Patient verbalized understanding of and agreement with management plan.     NOTE TO PATIENT: The 21st Century Cures Act makes medical notes like these available to patients in the interest of transparency. However, be advised this is a medical document. It is intended as peer to peer communication. It is written in medical language and may contain abbreviations or verbiage that are unfamiliar. It may appear blunt or direct. Medical documents are intended to carry relevant information, facts as evident, and the clinical opinion of the practitioner.     Time Statement:   Discussed plan of care in detail with patient today. Patient verbally understands and agrees. I have spent 45 minutes reviewing available diagnostics, obtaining history, examining the patient, developing a treatment plan, and educating the  patient on disease process and plan of care.    Enma Rizo PA-C   [facility]

## 2025-03-13 ENCOUNTER — OUTSIDE FACILITY SERVICE (OUTPATIENT)
Dept: GASTROENTEROLOGY | Facility: CLINIC | Age: 60
End: 2025-03-13
Payer: COMMERCIAL

## 2025-03-13 PROCEDURE — 43235 EGD DIAGNOSTIC BRUSH WASH: CPT | Performed by: INTERNAL MEDICINE

## 2025-03-13 PROCEDURE — 88305 TISSUE EXAM BY PATHOLOGIST: CPT | Performed by: INTERNAL MEDICINE

## 2025-03-13 PROCEDURE — 45380 COLONOSCOPY AND BIOPSY: CPT | Performed by: INTERNAL MEDICINE

## 2025-03-13 PROCEDURE — 45385 COLONOSCOPY W/LESION REMOVAL: CPT | Performed by: INTERNAL MEDICINE

## 2025-03-14 ENCOUNTER — LAB REQUISITION (OUTPATIENT)
Dept: LAB | Facility: HOSPITAL | Age: 60
End: 2025-03-14
Payer: COMMERCIAL

## 2025-03-14 DIAGNOSIS — D12.3 BENIGN NEOPLASM OF TRANSVERSE COLON: ICD-10-CM

## 2025-03-14 DIAGNOSIS — Z12.11 ENCOUNTER FOR SCREENING FOR MALIGNANT NEOPLASM OF COLON: ICD-10-CM

## 2025-03-14 DIAGNOSIS — K64.8 OTHER HEMORRHOIDS: ICD-10-CM

## 2025-03-17 ENCOUNTER — RESULTS FOLLOW-UP (OUTPATIENT)
Dept: LAB | Facility: HOSPITAL | Age: 60
End: 2025-03-17
Payer: COMMERCIAL

## 2025-03-17 LAB
CYTO UR: NORMAL
LAB AP CASE REPORT: NORMAL
LAB AP CLINICAL INFORMATION: NORMAL
PATH REPORT.FINAL DX SPEC: NORMAL
PATH REPORT.GROSS SPEC: NORMAL

## 2025-03-17 NOTE — LETTER
"Casie Butterfield  [address] [address]    March 17, 2025     Dear Ms. Butterfield:    Below are the results from your recent visit:    Resulted Orders   Tissue Pathology Exam   Result Value Ref Range    Case Report       Surgical Pathology Report                         Case: [id number]                                  Authorizing Provider:  Celio Krishnamurthy MD    Collected:           03/13/2025 02:14 PM          Ordering Location:     [facility]   Received:            03/14/2025 07:39 AM                                 LABORATORY                                                                   Pathologist:           Tereza Krishnamurthy DO                                                        Specimens:   1) - Ileum, terminal                                                                                2) - Colon, random colon biopsy                                                                     3) - Large Intestine, Transverse Colon                                                     Clinical Information       Encounter for screening for malignant neoplasm of colon  Benign neoplasm of transverse colon  Other hemorrhoids      Final Diagnosis       1) TERMINAL ILEUM, BIOPSY:  Small bowel mucosa with no significant pathologic abnormality    2) RANDOM COLON, BIOPSY:  Colonic mucosa with no significant pathologic abnormality    3) TRANSVERSE COLON:  Tubular adenoma  No high-grade dysplasia identified      Gross Description       1. Ileum, terminal.  Received in formalin labeled \"terminal ileum\" is a 0.3 x 0.3 x 0.2 cm tan soft tissue fragment submitted entirely in a single cassette.    2. Colon.  Received in formalin labeled \"random colon\" are 2 tan soft tissue fragments aggregating 0.4 x 0.4 x 0.2 cm submitted entirely in a single cassette.    3. Large Intestine, Transverse Colon.  Received in formalin labeled \"transverse colon\" is a 0.3 x 0.2 x 0.2 cm tan soft tissue " fragment submitted entirely in a single cassette. HDM        Microscopic Description       The slides are reviewed and demonstrate histopathologic features supporting the above rendered diagnosis.           Your transverse polyp has precancerous tissue in this location without cancer. I would recommend a repeat colonoscopy in 5 years due to a combination of both the endoscopic results and what our pathologists saw under the microscope.             Sincerely,        Celio rKishnamurthy MD

## 2025-04-22 ENCOUNTER — OFFICE VISIT (OUTPATIENT)
Dept: GASTROENTEROLOGY | Facility: CLINIC | Age: 60
End: 2025-04-22
Payer: COMMERCIAL

## 2025-04-22 VITALS
HEART RATE: 86 BPM | OXYGEN SATURATION: 97 % | SYSTOLIC BLOOD PRESSURE: 130 MMHG | HEIGHT: 67 IN | BODY MASS INDEX: 43.85 KG/M2 | TEMPERATURE: 97.3 F | WEIGHT: 279.4 LBS | DIASTOLIC BLOOD PRESSURE: 80 MMHG

## 2025-04-22 DIAGNOSIS — D12.6 TUBULAR ADENOMA OF COLON: ICD-10-CM

## 2025-04-22 DIAGNOSIS — K21.9 GASTROESOPHAGEAL REFLUX DISEASE, UNSPECIFIED WHETHER ESOPHAGITIS PRESENT: Primary | ICD-10-CM

## 2025-04-22 PROCEDURE — 99214 OFFICE O/P EST MOD 30 MIN: CPT | Performed by: PHYSICIAN ASSISTANT

## 2025-04-22 NOTE — PROGRESS NOTES
Follow Up      Patient Name: Casie Butterfield  : 1965   MRN: [id number]     Chief Complaint:    Chief Complaint   Patient presents with    Follow-up     Follow up from COLONOSCOPY/ EGD, pt is not currently experiencing any GI Symptoms.       History of Present Illness: Casie Butterfield is a 59 y.o. female who is here today for post procedure follow up.     Pt continues to do well with Dexilant 60mg daily. She reports GI sx only with dietary indiscretion / consuming known allergens (paprika, etc). Patient otherwise denies associated fever, chills, abdominal pain, indigestion, nausea, vomiting, diarrhea, constipation, hematemesis, dysphagia, hematochezia, melena, bloating, weight loss or gain, dysuria, jaundice or bruising.    EGD / CSY 3/2025 with Dr. Krishnamurthy. Bilious fluid in the gastric fundus. Mild gastritis in gastric body and antrum. Normal duodenum. Adequate bowel prep conditions. Internal hemorrhoids. Normal terminal ileum. Normal appearing colon mucosa, bx negative for microscopic colitis. 8mm polyp (tubular adenoma) in transverse colon, resected and retrieved. Recommend repeat CSY in 5 years.     Subjective      Review of Systems:   Review of Systems   Constitutional:  Negative for appetite change, chills, diaphoresis, fatigue, fever, unexpected weight gain and unexpected weight loss.   HENT:  Negative for drooling, facial swelling, mouth sores, rhinorrhea, sore throat, tinnitus, trouble swallowing and voice change.    Eyes: Negative.    Respiratory:  Negative for cough, chest tightness and shortness of breath.    Cardiovascular:  Negative for chest pain.   Gastrointestinal:  Negative for abdominal pain, blood in stool, constipation, diarrhea, nausea, vomiting, GERD and indigestion.   Genitourinary:  Negative for dysuria, flank pain, hematuria and pelvic pain.   Skin:  Negative for color change, pallor and rash.   Psychiatric/Behavioral:  Negative for behavioral problems.    All other  "systems reviewed and are negative.      Medications:     Current Outpatient Medications:     dexlansoprazole (Dexilant) 60 MG capsule, Take 1 capsule by mouth Daily., Disp: 90 capsule, Rfl: 3    DICLOFENAC PO, Take 75 mg by mouth Daily., Disp: , Rfl:     fluticasone (FLONASE) 50 MCG/ACT nasal spray, 2 sprays by Each Nare route Every Morning., Disp: , Rfl:     loratadine (Claritin) 10 MG tablet, Take 1 tablet by mouth Daily., Disp: , Rfl:     losartan (COZAAR) 50 MG tablet, Take 1 tablet by mouth Daily., Disp: , Rfl:     montelukast (SINGULAIR) 10 MG tablet, Take 1 tablet by mouth every night at bedtime., Disp: , Rfl:     rosuvastatin (CRESTOR) 5 MG tablet, Take 1 tablet by mouth Every Night., Disp: , Rfl:     Sod Picosulfate-Mag Ox-Cit Acd 10-3.5-12 MG-GM -GM/160ML solution, Take 350 mL by mouth Take As Directed for 1 dose., Disp: 350 mL, Rfl: 0    sulfamethoxazole-trimethoprim (BACTRIM DS,SEPTRA DS) 800-160 MG per tablet, , Disp: , Rfl:     Allergies:   Allergies   Allergen Reactions    Other Itching     STEROIDS- PT STATES IT CAUSES HER FACE TO TURN HEAD AND BECOME ITCHY       Social History:   Social History     Socioeconomic History    Marital status:    Tobacco Use    Smoking status: Former     Current packs/day: 0.00     Types: Cigarettes     Quit date:      Years since quittin.3    Smokeless tobacco: Never   Vaping Use    Vaping status: Never Used   Substance and Sexual Activity    Alcohol use: Not Currently    Drug use: Never    Sexual activity: Defer        Surgical History:   Past Surgical History:   Procedure Laterality Date     SECTION      ,     CHOLECYSTECTOMY  2005    LIPOMA EXCISION          Medical History:   Past Medical History:   Diagnosis Date    Allergies     HTN (hypertension)         Objective     Physical Exam:  Vital Signs:   Vitals:    25 0912   Weight: 127 kg (279 lb 6.4 oz)   Height: 170.2 cm (67.01\")     Body mass index is 43.75 kg/m². "     Physical Exam  Vitals and nursing note reviewed.   Constitutional:       Appearance: Normal appearance. She is normal weight. She is not ill-appearing or diaphoretic.      Comments: BMI 43.75   HENT:      Head: Normocephalic and atraumatic.      Right Ear: External ear normal.      Left Ear: External ear normal.      Nose: Nose normal.      Mouth/Throat:      Mouth: Mucous membranes are moist.      Pharynx: Oropharynx is clear.   Eyes:      Conjunctiva/sclera: Conjunctivae normal.      Pupils: Pupils are equal, round, and reactive to light.   Neck:      Thyroid: No thyromegaly.   Cardiovascular:      Rate and Rhythm: Normal rate and regular rhythm.      Pulses: Normal pulses.      Heart sounds: Normal heart sounds.   Pulmonary:      Effort: Pulmonary effort is normal.      Breath sounds: Normal breath sounds.   Abdominal:      General: Abdomen is flat. Bowel sounds are normal. There is no distension.      Tenderness: There is no abdominal tenderness.   Musculoskeletal:         General: Normal range of motion.      Cervical back: Normal range of motion and neck supple.      Right lower leg: No edema.      Left lower leg: No edema.   Skin:     General: Skin is warm and dry.   Neurological:      General: No focal deficit present.      Mental Status: She is oriented to person, place, and time.   Psychiatric:         Mood and Affect: Mood normal.         Assessment / Plan      Assessment/Plan:   There are no diagnoses linked to this encounter.     GERD  Tubular adenoma of colon   - continue Dexilant 60mg daily    - pt given GERD diet instructions, advised to avoid GI irritants such as caffeine, carbonation, EtOH, tobacco, chocolate, peppermint, acid-based foods   - previous office notes, hospital records, labs, imaging, endoscopy and pathology reports reviewed with patient    - follow up in clinic in 9mo, or after completion of above studies   - call clinic at any time for questions or new / worsened sx      Follow  Up:   Return in about 9 months (around 1/22/2026).    Plan of care reviewed with the patient at the conclusion of today's visit.  Education was provided regarding diagnosis, management, and any prescribed or recommended OTC medications.  Patient verbalized understanding of and agreement with management plan.     NOTE TO PATIENT: The 21st Century Cures Act makes medical notes like these available to patients in the interest of transparency. However, be advised this is a medical document. It is intended as peer to peer communication. It is written in medical language and may contain abbreviations or verbiage that are unfamiliar. It may appear blunt or direct. Medical documents are intended to carry relevant information, facts as evident, and the clinical opinion of the practitioner.     Time Statement:   Discussed plan of care in detail with patient today. Patient verbally understands and agrees. I have spent 30 minutes reviewing available diagnostics, obtaining history, examining the patient, developing a treatment plan, and educating the patient on disease process and plan of care.     Enma Rizo PA-C   [facility]

## 2025-06-12 ENCOUNTER — HOSPITAL ENCOUNTER (EMERGENCY)
Facility: HOSPITAL | Age: 60
Discharge: HOME | End: 2025-06-12
Attending: EMERGENCY MEDICINE
Payer: COMMERCIAL

## 2025-06-12 VITALS
TEMPERATURE: 98 F | OXYGEN SATURATION: 100 % | SYSTOLIC BLOOD PRESSURE: 137 MMHG | WEIGHT: 274 LBS | HEIGHT: 67 IN | HEART RATE: 81 BPM | RESPIRATION RATE: 18 BRPM | DIASTOLIC BLOOD PRESSURE: 75 MMHG | BODY MASS INDEX: 43 KG/M2

## 2025-06-12 DIAGNOSIS — B02.8 HERPES ZOSTER WITH OTHER COMPLICATION: ICD-10-CM

## 2025-06-12 DIAGNOSIS — R51.9 FACIAL PAIN, ACUTE: ICD-10-CM

## 2025-06-12 DIAGNOSIS — G51.0 FACIAL PARALYSIS/BELLS PALSY: Primary | ICD-10-CM

## 2025-06-12 PROCEDURE — 99282 EMERGENCY DEPT VISIT SF MDM: CPT

## 2025-06-12 RX ORDER — PREDNISONE 20 MG/1
20 TABLET ORAL 3 TIMES DAILY
Qty: 21 TABLET | Refills: 0 | Status: SHIPPED | OUTPATIENT
Start: 2025-06-12 | End: 2025-06-19

## 2025-06-12 NOTE — DISCHARGE INSTRUCTIONS
Keep outpatient follow-up with ophthalmology and with primary care physician.    Take prednisone as prescribed.    Keep the right eye moist with eyedrops and tape it shut at night.    Return to the ER with any further concern.

## 2025-06-12 NOTE — ED PROVIDER NOTES
Subjective   History of Present Illness  59-year-old female who presents for evaluation of right sided facial symptoms.  The patient reports that over 2 weeks ago she began to have swelling of her right ear with associated pain posterior to the right ear.  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  Therefore last Thursday the patient was evaluated by ENT.  He was concerned about Melia Paredes but did not definitively see lesions in the external auditory canal or on the tympanic membrane therefore this diagnosis was not administered.  The patient has completed a full course of Erika acyclovir as treatment for possible shingles as she also has a rash along the right side of her neck, the right jaw, and extending mildly to the right side of the neck and right upper chest.  She at least for the last 4 if not more days has been having a slow blink on the right side and continues to have weakness in the right side of the face.  She was referred here by primary care physician.  The patient does not have any weakness to the arms or legs.  She exhibits normal speech and mentation.  No other acute complaints.      Review of Systems   Constitutional:  Negative for chills, fatigue and fever.   HENT:  Negative for congestion, ear pain, postnasal drip, sinus pressure and sore throat.    Eyes:  Negative for pain, redness and visual disturbance.   Respiratory:  Negative for cough, chest tightness and shortness of breath.    Cardiovascular:  Negative for chest pain, palpitations and leg swelling.   Gastrointestinal:  Negative for abdominal pain, anal bleeding, blood in stool, diarrhea, nausea and vomiting.   Endocrine: Negative for polydipsia and polyuria.   Genitourinary:  Negative for difficulty urinating, dysuria, frequency and urgency.   Musculoskeletal:  Negative for arthralgias, back pain and neck pain.   Skin:  Positive for rash. Negative for pallor.    Allergic/Immunologic: Negative for environmental allergies and immunocompromised state.   Neurological:  Positive for weakness and numbness. Negative for dizziness and headaches.   Hematological:  Negative for adenopathy.   Psychiatric/Behavioral:  Negative for confusion, self-injury and suicidal ideas. The patient is not nervous/anxious.    All other systems reviewed and are negative.      Past Medical History:   Diagnosis Date    Allergies     HTN (hypertension)        Allergies   Allergen Reactions    Other Itching     STEROIDS- PT STATES IT CAUSES HER FACE TO TURN HEAD AND BECOME ITCHY       Past Surgical History:   Procedure Laterality Date     SECTION      ,     CHOLECYSTECTOMY      LIPOMA EXCISION         Family History   Problem Relation Age of Onset    Heart disease Mother     Epilepsy Mother     Asthma Sister     Mitral valve prolapse Sister     Anxiety disorder Sister     No Known Problems Brother     Heart attack Maternal Grandmother     Alcohol abuse Maternal Grandmother     Heart attack Maternal Grandfather     Hypertension Paternal Grandmother     Parkinsonism Paternal Grandfather     Colon cancer Neg Hx        Social History     Socioeconomic History    Marital status:    Tobacco Use    Smoking status: Former     Current packs/day: 0.00     Types: Cigarettes     Quit date:      Years since quittin.4    Smokeless tobacco: Never   Vaping Use    Vaping status: Never Used   Substance and Sexual Activity    Alcohol use: Not Currently    Drug use: Never    Sexual activity: Defer           Objective   Physical Exam  Vitals and nursing note reviewed.   Constitutional:       General: She is not in acute distress.     Appearance: Normal appearance. She is well-developed. She is not toxic-appearing or diaphoretic.   HENT:      Head: Normocephalic and atraumatic.      Comments: Right lower facial droop and decreased motor activity of the right forehead.    Obvious Bell's  phenomenon with decreased ability to close the right eyelid without foreceful closing.      Right Ear: External ear normal.      Left Ear: External ear normal.      Nose: Nose normal.   Eyes:      General: Lids are normal.      Pupils: Pupils are equal, round, and reactive to light.   Neck:      Trachea: No tracheal deviation.   Cardiovascular:      Rate and Rhythm: Normal rate and regular rhythm.      Pulses: No decreased pulses.      Heart sounds: Normal heart sounds. No murmur heard.     No friction rub. No gallop.   Pulmonary:      Effort: Pulmonary effort is normal. No respiratory distress.      Breath sounds: Normal breath sounds. No decreased breath sounds, wheezing, rhonchi or rales.   Abdominal:      General: Bowel sounds are normal.      Palpations: Abdomen is soft.      Tenderness: There is no abdominal tenderness. There is no guarding or rebound.   Musculoskeletal:         General: No deformity. Normal range of motion.      Cervical back: Normal range of motion and neck supple.   Lymphadenopathy:      Cervical: No cervical adenopathy.   Skin:     General: Skin is warm and dry.      Findings: No rash.   Neurological:      Mental Status: She is alert and oriented to person, place, and time.      Cranial Nerves: No cranial nerve deficit.      Sensory: No sensory deficit.   Psychiatric:         Speech: Speech normal.         Behavior: Behavior normal.         Thought Content: Thought content normal.         Judgment: Judgment normal.         Procedures           ED Course                                                       Medical Decision Making  Differential includes Bell's palsy, stroke, shingles, intracranial hemorrhage, schwannoma, other unspecified etiology.    The patient's presentation appears consistent with shingles with associated Bell's palsy.    I discussed performing MRI but the patient would prefer to defer this exam at this time.    She has already completed a course of valacyclovir, I will  prescribe a course of high-dose steroids as she is not immunocompromised and is not a diabetic.    I have informed her given the length of her symptoms that the steroids may not provide any benefit.    I have recommended keeping the right eye moist with saline eyedrops, and taping it shut at night.  She has also been referred to ophthalmology for outpatient follow-up.    Advised to return to the ER with any further concern.      Problems Addressed:  Facial pain, acute: complicated acute illness or injury with systemic symptoms  Facial paralysis/Fedora palsy: complicated acute illness or injury with systemic symptoms  Herpes zoster with other complication: complicated acute illness or injury with systemic symptoms    Amount and/or Complexity of Data Reviewed  Independent Historian: spouse     Details:  provides additional information.    Risk  Prescription drug management.        Final diagnoses:   Facial paralysis/Fedora palsy   Herpes zoster with other complication   Facial pain, acute       ED Disposition  ED Disposition       ED Disposition   Discharge    Condition   Stable    Comment   --               Mickey Guzman MD  [address] [place] [address] [phone]    In 1 week      Cande Laird MD  [address]  [address]  [place] [address]  [phone]    Schedule an appointment as soon as possible for a visit            Medication List        New Prescriptions      predniSONE 20 MG tablet  Commonly known as: DELTASONE  Take 1 tablet by mouth 3 (Three) Times a Day for 7 days.               Where to Get Your Medications        These medications were sent to [facility] - [place], [place] - [address] - [phone]  - [phone]  [address] [address]      Phone: [phone]   predniSONE 20 MG tablet            Yomi Orozco MD  06/12/25 7937

## 2025-06-12 NOTE — Clinical Note
[facility] EMERGENCY DEPARTMENT  [address]  [facility] [address]  Phone: [phone]    Casie Butterfield was seen and treated in our emergency department on 6/12/2025.  She may return to work on 06/16/2025.         Thank you for choosing [facility].    Yomi Orozco MD

## 2025-07-18 ENCOUNTER — HOSPITAL ENCOUNTER (OUTPATIENT)
Dept: CARDIOLOGY | Facility: HOSPITAL | Age: 60
Discharge: HOME | End: 2025-07-18
Payer: COMMERCIAL

## 2025-07-18 ENCOUNTER — OFFICE VISIT (OUTPATIENT)
Dept: CARDIOLOGY | Facility: HOSPITAL | Age: 60
End: 2025-07-18
Payer: COMMERCIAL

## 2025-07-18 VITALS
SYSTOLIC BLOOD PRESSURE: 140 MMHG | HEIGHT: 67 IN | RESPIRATION RATE: 16 BRPM | DIASTOLIC BLOOD PRESSURE: 75 MMHG | HEART RATE: 84 BPM | OXYGEN SATURATION: 100 % | BODY MASS INDEX: 42.67 KG/M2 | WEIGHT: 271.9 LBS

## 2025-07-18 DIAGNOSIS — I10 PRIMARY HYPERTENSION: ICD-10-CM

## 2025-07-18 DIAGNOSIS — E78.2 MIXED HYPERLIPIDEMIA: ICD-10-CM

## 2025-07-18 DIAGNOSIS — I49.9 IRREGULAR HEARTBEAT: ICD-10-CM

## 2025-07-18 DIAGNOSIS — I49.9 IRREGULAR HEARTBEAT: Primary | ICD-10-CM

## 2025-07-18 PROCEDURE — 93005 ELECTROCARDIOGRAM TRACING: CPT | Performed by: NURSE PRACTITIONER

## 2025-07-18 RX ORDER — METOPROLOL TARTRATE 25 MG/1
TABLET, FILM COATED ORAL
Qty: 60 TABLET | Refills: 3 | Status: SHIPPED | OUTPATIENT
Start: 2025-07-18

## 2025-07-18 NOTE — PROGRESS NOTES
"Chief Complaint  Follow-up and Palpitations    Subjective    History of Present Illness {CC  Problem List  Visit  Diagnosis   Encounters  Notes  Medications  Labs  Result Review Imaging  Media :23}       History of Present Illness   59-year-old patient with presents today for follow-up irregular heartbeats .  She reports that she has been experiencing irregular heartbeats intermittently for the past few weeks that she describes as a fluttering sensation.  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  She notes palpitations often occur after eating and usually improved with drinking Mylanta.  She reports she underwent a stress test November 2020 [place] from and that was within normal limits.  She was not having chest pain at that time but irregular beats and they decided to proceed with stress test.Blood pressure is usually well-controlled 130s systolic on losartan  Objective     Vital Signs:   Vitals:    07/18/25 0937   BP: 140/75   BP Location: Left arm   Patient Position: Sitting   Cuff Size: Large Adult   Pulse: 84   Resp: 16   SpO2: 100%   Weight: 123 kg (271 lb 14.4 oz)   Height: 170.2 cm (67\")     Body mass index is 42.59 kg/m².  Physical Exam  Vitals and nursing note reviewed.   Constitutional:       Appearance: Normal appearance.   HENT:      Head: Normocephalic.   Eyes:      Pupils: Pupils are equal, round, and reactive to light.   Cardiovascular:      Rate and Rhythm: Normal rate and regular rhythm.      Pulses: Normal pulses.      Heart sounds: Normal heart sounds. No murmur heard.  Pulmonary:      Effort: Pulmonary effort is normal.      Breath sounds: Normal breath sounds.   Abdominal:      General: Bowel sounds are normal.      Palpations: Abdomen is soft.   Musculoskeletal:         General: Normal range of motion.      Cervical back: Normal range of motion.      Right lower leg: No edema.      Left lower leg: No edema.   Skin:    "  General: Skin is warm and dry.      Capillary Refill: Capillary refill takes less than 2 seconds.   Neurological:      Mental Status: She is alert and oriented to person, place, and time.   Psychiatric:         Mood and Affect: Mood normal.         Thought Content: Thought content normal.              Result Review  Data Reviewed:{ Labs  Result Review  Imaging  Med Tab  Media :23}   Vent. Rate :  69 BPM     Atrial Rate :  69 BPM     P-R Int : 176 ms          QRS Dur :  72 ms      QT Int : 374 ms       P-R-T Axes :  48  58  41 degrees    QTcB Int : 400 ms     Normal sinus rhythm  Normal ECG  When compared with ECG of 07-Feb-2023 14:02,  No significant change was found     Holter Monitor - 72 Hour Up To 15 Days (02/20/2023 12:43)          Assessment and Plan {CC Problem List  Visit Diagnosis  ROS  Review (Popup)  Health Maintenance  Quality  BestPractice  Medications  SmartSets  SnapShot Encounters  Media :23}   1. Irregular heartbeat  May use Lopressor 12.5 to 25 mg p.o. twice daily as needed for palpitations  - ECG 12 Lead; Future    2. Primary hypertension  Stable on losartan  Monitor closely  3. Mixed hyperlipidemia   stable on Crestor        Follow Up {Instructions Charge Capture  Follow-up Communications :23}   Return if symptoms worsen or fail to improve.    Patient was given instructions and counseling regarding her condition or for health maintenance advice. Please see specific information pulled into the AVS if appropriate.  Patient was instructed to call the [facility] with any questions, concerns, or worsening symptoms.

## 2025-07-19 LAB
QT INTERVAL: 374 MS
QTC INTERVAL: 400 MS